# Patient Record
Sex: FEMALE | Race: BLACK OR AFRICAN AMERICAN | Employment: UNEMPLOYED | URBAN - METROPOLITAN AREA
[De-identification: names, ages, dates, MRNs, and addresses within clinical notes are randomized per-mention and may not be internally consistent; named-entity substitution may affect disease eponyms.]

---
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
## 2024-02-16 NOTE — PATIENT INSTRUCTIONS
[facility]     OPERATIVE NOTE     Maggie Pelletier Patient Status:  Inpatient    1946 MRN [id number]   Location [facility] 8NE-A Attending Oleg Addison MD   Hosp Day # 2 PCP LUZ MARIA BLACKBURN     Date of Surgery:  2/15/2024     Preoperative Diagnosis:   Right second metatarsal osteomyelitis  Diabetic foot ulcer, right foot    Postoperative Diagnosis:   Right second metatarsal osteomyelitis  Diabetic foot ulcer, right foot    Primary Surgeon: Kev Ba DPM    Assistant: None    Procedures:   Right second metatarsal head resection  Excision of right foot ulcerations with primary closure     Surgical Findings: Right foot cellulitis present.  Second metatarsal head necrotic in appearance and soft in nature. Appropriate bleeding noted throughout the procedure    Anesthesia: General    Complications: none    Estimated Blood Loss: No data recorded    Implants: * No implants in log *    Specimen:   ID Type Source Tests Collected by Time Destination   1 : 2nd metatarsal head Tissue Toe ANAEROBIC CULTURE, TISSUE AEROBIC CULTURE, SURGICAL PATHOLOGY TISSUE Colten Ba DPM 2/15/2024  7:46 PM    2 : right 2nd metatarsal head clearance Tissue Toe SURGICAL PATHOLOGY TISSUE Colten Ba DPM 2/15/2024  7:53 PM        Drains: None    Condition: Stable      Preoperative history/indications:  Patient is well-known to me as I have been seeing her in the wound care center due to nonhealing ulcerations to her right foot.  There is exposed second metatarsal head and recent MRI did reveal concerns of osteomyelitis to the right second metatarsal head.  Patient was revascularized this week with improvements in distal arterial flow noted on arterial Dopplers.  Because of improvements in blood flow with chronic dorsal and plantar ulcerations with exposed second metatarsal head that is confirmed to have a early osteomyelitis on MRI, recommend moving forward with right second metatarsal head resection with excision of  Well Care - Tips for Teens: Care Instructions Your Care Instructions Being a teen can be exciting and tough. You are finding your place in the world. And you may have a lot on your mind these days tooschool, friends, sports, parents, and maybe even how you look. Some teens begin to feel the effects of stress, such as headaches, neck or back pain, or an upset stomach. To feel your best, it is important to start good health habits now. Follow-up care is a key part of your treatment and safety. Be sure to make and go to all appointments, and call your doctor if you are having problems. It's also a good idea to know your test results and keep a list of the medicines you take. How can you care for yourself at home? Staying healthy can help you cope with stress or depression. Here are some tips to keep you healthy. · Get at least 30 minutes of exercise on most days of the week. Walking is a good choice. You also may want to do other activities, such as running, swimming, cycling, or playing tennis or team sports. · Try cutting back on time spent on TV or video games each day. · Munch at least 5 helpings of fruits and veggies. A helping is a piece of fruit or ½ cup of vegetables. · Cut back to 1 can or small cup of soda or juice drink a day. Try water and milk instead. · Cheese, yogurt, milkhave at least 3 cups a day to get the calcium you need. · The decision to have sex is a serious one that only you can make. Not having sex is the best way to prevent HIV, STIs (sexually transmitted infections), and pregnancy. · If you do choose to have sex, condoms and birth control can increase your chances of protection against STIs and pregnancy. · Talk to an adult you feel comfortable with. Confide in this person and ask for his or her advice. This can be a parent, a teacher, a , or someone else you trust. 
Healthy ways to deal with stress · Get 9 to 10 hours of sleep every night. · Eat healthy meals. right foot wounds with primary closure.  The procedure was discussed with patient in detail.  All of their questions and concerns were answered and the patient would like to proceed with surgical intervention.    Informed Consent:  All treatment options have been discussed with the patient of both conservative and surgical attempt at correction including the potential risks and complications of surgical intervention including but not exhaustive of death, loss of limb, post op pain, swelling, infection, bleeding, extended healing, and the possibility of further and future surgery.  No guarantees have been made to the pt and the informed consent has been signed.    Procedure in detail:  The patient was brought into the operating room and placed on the operating table in the supine position.  Pre-operative antibiotics were given per SCIP protocols.  A pneumatic tourniquet was not utilized for this procedure.  A formal timeout was performed and the OR staff were all in agreement. Following IV sedation, the right foot was then scrubbed, prepped, and draped in the usual aseptic manner.  Local anesthesia was then obtained about the right second ray utilizing 10 cc's of 0.5% marcaine plain.   Attention was first directed to the plantar right ulceration where the ulceration was ellipsed out utilizing a #10 blade.  Following excision of ulceration, wound was flushed with copious amounts of sterile saline.  The plantar incision was then closed without tension utilizing 2-0 nylon, as well as Hemigard Adhesive Retention Suture Device per  protocol.    Following this, attention was directed to the dorsal foot ulceration where and incision was placed overlying the second metatarsal head, including ellipsing out the ulceration.  Incision was taken down to level of periosteum.  Periosteum was then reflected off of the second metatarsal head.  Next, a sagittal saw was utilized to perform a metatarsal head resection at the  · Go for a long walk. · Dance. Shoot hoops. Go for a bike ride. Get some exercise. · Talk with someone you trust. 
· Laugh, cry, sing, or write in a journal. 
When should you call for help? Call 911 anytime you think you may need emergency care. For example, call if: 
  · You feel life is meaningless or think about killing yourself.  
Almamichael Ramirez to a counselor or doctor if any of the following problems lasts for 2 or more weeks. 
  · You feel sad a lot or cry all the time.  
  · You have trouble sleeping or sleep too much.  
  · You find it hard to concentrate, make decisions, or remember things.  
  · You change how you normally eat.  
  · You feel guilty for no reason. Where can you learn more? Go to http://raman-zulma.info/. Enter N871 in the search box to learn more about \"Well Care - Tips for Teens: Care Instructions. \" Current as of: December 12, 2018 Content Version: 12.1 © 0638-6663 [employer]. Care instructions adapted under license by [employer] (which disclaims liability or warranty for this information). If you have questions about a medical condition or this instruction, always ask your healthcare professional. [address] any warranty or liability for your use of this information. level of the surgical neck of the second metatarsal in a dorsal distal to plantar proximal direction.  The metatarsal head was then sharply removed from all soft tissue attachments and passed from the operative field.  The excised metatarsal head appeared soft in nature necrotic in appearance.  The incision site was then flushed with copious amounts of sterile saline.  Utilizing a bone rongeur, a second metatarsal clearance fragment was obtained.  This bone was passed from the operative field and sent to pathology for further evaluation.  All sharp edges to the remaining metatarsal were smoothed utilizing a bone rasp.  Appropriate bleeding was noted to the surgical sites throughout the procedure.  The incision was then closed utilizing 3-0 nylon under no tension.    Upon completion of the procedure, a postoperative block consisting of 10 cc's of 0.5% Marcaine plain was injected along the incision sites.  The incisions were dressed with Betadine soaked Adaptic and covered with sterile compressive dressings consisting of 4 x 4 gauze, ABD pad, Kerlix, and Ace bandage.  A well-padded posterior splint and ace wrap was then applied to the surgical extremity.  The pt tolerated the procedure and anesthesia well.  They were transferred to the recovery room with VSS and vascular status intact to surgical extremity.  Following a period of postoperative monitoring, the patient will be discharged back to the hospital for on the following written and oral postop instructions: keep dressings C/D/I, remain nonweightbearing to surgical extremity in postop shoe, ice and elevate surgical extremity when at rest.  Patient will contact my office for all postoperative f/u care and should any problems arise.      Kev Ba DPM   2/15/2024

## 2024-03-25 ENCOUNTER — OFFICE VISIT (OUTPATIENT)
Age: 19
End: 2024-03-25

## 2024-03-25 VITALS
HEART RATE: 91 BPM | DIASTOLIC BLOOD PRESSURE: 73 MMHG | RESPIRATION RATE: 16 BRPM | BODY MASS INDEX: 17.52 KG/M2 | TEMPERATURE: 98.5 F | WEIGHT: 109 LBS | OXYGEN SATURATION: 97 % | SYSTOLIC BLOOD PRESSURE: 105 MMHG | HEIGHT: 66 IN

## 2024-03-25 DIAGNOSIS — R09.81 NASAL CONGESTION: ICD-10-CM

## 2024-03-25 DIAGNOSIS — J30.89 ENVIRONMENTAL AND SEASONAL ALLERGIES: ICD-10-CM

## 2024-03-25 DIAGNOSIS — R05.9 COUGH, UNSPECIFIED TYPE: ICD-10-CM

## 2024-03-25 DIAGNOSIS — J45.20 MILD INTERMITTENT ASTHMA WITHOUT COMPLICATION: Primary | ICD-10-CM

## 2024-03-25 LAB
INFLUENZA A ANTIGEN, POC: NEGATIVE
INFLUENZA B ANTIGEN, POC: NEGATIVE
Lab: NORMAL
PERFORMING INSTRUMENT: NORMAL
QC PASS/FAIL: NORMAL
SARS-COV-2, POC: NORMAL
VALID INTERNAL CONTROL, POC: YES

## 2024-03-25 RX ORDER — DEXTROMETHORPHAN HYDROBROMIDE AND PROMETHAZINE HYDROCHLORIDE 15; 6.25 MG/5ML; MG/5ML
5 SYRUP ORAL 4 TIMES DAILY PRN
Qty: 118 ML | Refills: 0 | Status: SHIPPED | OUTPATIENT
Start: 2024-03-25 | End: 2024-04-01

## 2024-03-25 RX ORDER — METHYLPREDNISOLONE 4 MG/1
4 TABLET ORAL SEE ADMIN INSTRUCTIONS
Qty: 1 KIT | Refills: 0 | Status: SHIPPED | OUTPATIENT
Start: 2024-03-25

## 2024-03-25 RX ORDER — BENZONATATE 100 MG/1
100 CAPSULE ORAL 3 TIMES DAILY PRN
Qty: 30 CAPSULE | Refills: 0 | Status: SHIPPED | OUTPATIENT
Start: 2024-03-25 | End: 2024-04-04

## 2024-03-25 RX ORDER — FLUTICASONE PROPIONATE AND SALMETEROL 250; 50 UG/1; UG/1
1 POWDER RESPIRATORY (INHALATION) EVERY 12 HOURS
Qty: 60 EACH | Refills: 0 | Status: SHIPPED | OUTPATIENT
Start: 2024-03-25

## 2024-03-25 ASSESSMENT — ENCOUNTER SYMPTOMS
WHEEZING: 1
EYES NEGATIVE: 1
RHINORRHEA: 1
ALLERGIC/IMMUNOLOGIC NEGATIVE: 1
GASTROINTESTINAL NEGATIVE: 1

## 2024-03-25 NOTE — PROGRESS NOTES
3/25/2024   Therese Garvey (: 2005) is a 18 y.o. female, New patient, here for evaluation of the following chief complaint(s):  Chest Congestion (Started a couple days ago with nasal congestion. )     ASSESSMENT/PLAN:  Below is the assessment and plan developed based on review of pertinent history, physical exam, labs, studies, and medications.  1. Mild intermittent asthma without complication  -     methylPREDNISolone (MEDROL, RAMYA,) 4 MG tablet; Take 1 tablet by mouth See Admin Instructions Take by mouth., Disp-1 kit, R-0Normal  -     fluticasone-salmeterol (ADVAIR DISKUS) 250-50 MCG/ACT AEPB diskus inhaler; Inhale 1 puff into the lungs in the morning and 1 puff in the evening., Disp-60 each, R-0Normal  2. Environmental and seasonal allergies  -     methylPREDNISolone (MEDROL, RAMYA,) 4 MG tablet; Take 1 tablet by mouth See Admin Instructions Take by mouth., Disp-1 kit, R-0Normal  3. Nasal congestion  -     POCT COVID-19, Antigen  -     AMB POC INFLUENZA A  AND B REAL-TIME RT-PCR  4. Cough, unspecified type  -     promethazine-dextromethorphan (PROMETHAZINE-DM) 6.25-15 MG/5ML syrup; Take 5 mLs by mouth 4 times daily as needed for Cough, Disp-118 mL, R-0Normal  -     benzonatate (TESSALON) 100 MG capsule; Take 1 capsule by mouth 3 times daily as needed for Cough, Disp-30 capsule, R-0Normal         Handout given with care instructions  2. OTC for symptom management. Increase fluid intake, ensure adequate nutritional intake.  3. Follow up with PCP as needed.  4. Go to ED with development of any acute symptoms.     Follow up:  Return if symptoms worsen or fail to improve.  Follow up immediately for any new, worsening or changes or if symptoms are not improving over the next 5-7 days.     SUBJECTIVE/OBJECTIVE:    Chest Congestion  Associated symptoms: congestion, rhinorrhea and wheezing         Diagnoses and all orders for this visit:  Mild intermittent asthma without complication  Environmental and seasonal

## 2025-02-18 ENCOUNTER — OFFICE VISIT (OUTPATIENT)
Age: 20
End: 2025-02-18

## 2025-02-18 VITALS
RESPIRATION RATE: 16 BRPM | TEMPERATURE: 101 F | SYSTOLIC BLOOD PRESSURE: 109 MMHG | OXYGEN SATURATION: 100 % | WEIGHT: 114 LBS | HEIGHT: 66 IN | HEART RATE: 113 BPM | DIASTOLIC BLOOD PRESSURE: 75 MMHG | BODY MASS INDEX: 18.32 KG/M2

## 2025-02-18 DIAGNOSIS — R68.89 FLU-LIKE SYMPTOMS: ICD-10-CM

## 2025-02-18 DIAGNOSIS — Z3A.14 14 WEEKS GESTATION OF PREGNANCY: ICD-10-CM

## 2025-02-18 DIAGNOSIS — J06.9 VIRAL UPPER RESPIRATORY TRACT INFECTION: Primary | ICD-10-CM

## 2025-02-18 LAB
INFLUENZA A ANTIGEN, POC: NORMAL
INFLUENZA B ANTIGEN, POC: NORMAL
Lab: NORMAL
PERFORMING INSTRUMENT: NORMAL
QC PASS/FAIL: NORMAL
SARS-COV-2, POC: NORMAL

## 2025-02-18 ASSESSMENT — ENCOUNTER SYMPTOMS
ALLERGIC/IMMUNOLOGIC NEGATIVE: 1
RHINORRHEA: 1
COUGH: 1
EYES NEGATIVE: 1
GASTROINTESTINAL NEGATIVE: 1

## 2025-02-18 NOTE — PROGRESS NOTES
2025   Therese Garvey (: 2005) is a 19 y.o. female, New patient, here for evaluation of the following chief complaint(s):  Cough (Pt c/o chest tightness with cough, coughing yellow mucus for the past 2 days. She is 14 weeks pregnant. She did take tylenol around 6am today./)     ASSESSMENT/PLAN:  Below is the assessment and plan developed based on review of pertinent history, physical exam, labs, studies, and medications.       Assessment & Plan  Viral upper respiratory tract infection  The patient is a good candidate for outpatient therapy based on normal PO intake, reassuring exam with clear lungs on auscultation, normal oxygen saturations and lack of respiratory distress upon discharge.    Discharge decision based on the following:  clinical impression is consistent with outpatient treatment, patient's exam is stable and patient's condition is stable.    -Provided reassurance and reassessment  -Discussed over-the-counter medications for symptomatic relief  -Provided educational material and patient instructions  -Discharged patient with instructions to follow up with PCP  -Advised to go immediately to the ED for worsening or persistent symptoms     14 weeks gestation of pregnancy  Follow up with OBGYN     Flu-like symptoms  OTC approved medication by OBGYN for comfort   Follow up with PCP and OBGYN   Orders:    POCT Influenza A/B Antigen    POCT COVID-19, Antigen      Handout given with care instructions  OTC for symptom management. Increase fluid intake, ensure adequate nutritional intake.  Follow up with PCP as needed.  Go to ED with development of any acute symptoms.     Follow up:  No follow-ups on file.  Follow up immediately for any new, worsening or changes or if symptoms are not improving over the next 5-7 days.     SUBJECTIVE/OBJECTIVE:    History provided by:  Patient   used: No           Cough (Pt c/o chest tightness with cough, coughing yellow mucus for the past 2

## (undated) DEVICE — SOLUTION IV 1000ML 0.9% SOD CHL

## (undated) DEVICE — GOWN,SIRUS,NONRNF,SETINSLV,2XL,18/CS: Brand: MEDLINE

## (undated) DEVICE — GAUZE SPONGES,12 PLY: Brand: CURITY

## (undated) DEVICE — DRAPE,REIN 53X77,STERILE: Brand: MEDLINE

## (undated) DEVICE — SYR 10ML LUER LOK 1/5ML GRAD --

## (undated) DEVICE — CURITY NON-ADHERENT STRIPS: Brand: CURITY

## (undated) DEVICE — TOWEL SURG W17XL27IN STD BLU COT NONFENESTRATED PREWASHED

## (undated) DEVICE — BANDAGE COMPR SELF ADH 5 YDX4 IN TAN STRL PREMIERPRO LF

## (undated) DEVICE — DEVON™ KNEE AND BODY STRAP 60" X 3" (1.5 M X 7.6 CM): Brand: DEVON

## (undated) DEVICE — INFECTION CONTROL KIT SYS

## (undated) DEVICE — SUTURE VCRL SZ 2-0 L27IN ABSRB UD L26MM SH 1/2 CIR J417H

## (undated) DEVICE — HOOK LOCK LATEX FREE ELASTIC BANDAGE 3INX5YD

## (undated) DEVICE — ROCKER SWITCH PENCIL BLADE ELECTRODE, HOLSTER: Brand: EDGE

## (undated) DEVICE — SUTURE MCRYL SZ 4 0 L18IN ABSRB UD PC 5 L19MM 3 8 CIR SGL Y823G

## (undated) DEVICE — (D)PREP SKN CHLRAPRP APPL 26ML -- CONVERT TO ITEM 371833

## (undated) DEVICE — SURGICAL PROCEDURE PACK BASIN MAJ SET CUST NO CAUT

## (undated) DEVICE — REM POLYHESIVE ADULT PATIENT RETURN ELECTRODE: Brand: VALLEYLAB

## (undated) DEVICE — HOOK LOCK LATEX FREE ELASTIC BANDAGE 4INX5YD

## (undated) DEVICE — STERILE POLYISOPRENE POWDER-FREE SURGICAL GLOVES: Brand: PROTEXIS

## (undated) DEVICE — (D)STRIP SKN CLSR 0.5X4IN WHT --

## (undated) DEVICE — FRAZIER SUCTION INSTRUMENT 12 FR W/CONTROL VENT & OBTURATOR: Brand: FRAZIER

## (undated) DEVICE — DRAPE,EXTREMITY,89X128,STERILE: Brand: MEDLINE